# Patient Record
Sex: MALE
[De-identification: names, ages, dates, MRNs, and addresses within clinical notes are randomized per-mention and may not be internally consistent; named-entity substitution may affect disease eponyms.]

---

## 2018-05-05 ENCOUNTER — HOSPITAL ENCOUNTER (EMERGENCY)
Dept: HOSPITAL 31 - C.ER | Age: 48
Discharge: HOME | End: 2018-05-05
Payer: COMMERCIAL

## 2018-05-05 VITALS — SYSTOLIC BLOOD PRESSURE: 134 MMHG | DIASTOLIC BLOOD PRESSURE: 93 MMHG | HEART RATE: 76 BPM

## 2018-05-05 VITALS — RESPIRATION RATE: 18 BRPM | OXYGEN SATURATION: 99 % | TEMPERATURE: 97.8 F

## 2018-05-05 DIAGNOSIS — M54.5: Primary | ICD-10-CM

## 2018-05-05 NOTE — RAD
PROCEDURE:  Lumbar spine dated 05/05/2018 



HISTORY:

Back pain 



COMPARISON:

No prior.



FINDINGS:



BONES:

Normal alignment. No listhesis. No fracture.



DISC SPACES:

Disc space heights relatively maintained.  Small marginal osteophyte 

formation seen at the L4-L5 level.  Facets also slightly overgrown at 

L5-S1 through the L3-L4 levels in somewhat decreasing order of 

severity



OTHER FINDINGS:

None.



IMPRESSION:

No evidence of acute compression fractures no retropulsed fragments. 

Minor multilevel degenerative spondylosis

## 2018-05-05 NOTE — C.PDOC
History Of Present Illness


47-year-old male, presents to the emergency department with complaints of back 

pain x3 days. 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 Patient is 

currently complaining of lower back. Denies any bladder/bowel incontinence. 

Denies any abdominal pain, numbness/weakness, nausea/vomiting. patient did not 

take anything for the pain. 


Time Seen by Provider: 05/05/18 09:20


Chief Complaint (Nursing): Back Pain


History Per: Patient


History/Exam Limitations: no limitations





Past Medical History


Reviewed: Historical Data, Nursing Documentation, Vital Signs


Vital Signs: 


 Last Vital Signs











Temp  97.8 F   05/05/18 09:13


 


Pulse  76   05/05/18 11:09


 


Resp  18   05/05/18 11:09


 


BP  134/93 H  05/05/18 11:09


 


Pulse Ox  99   05/05/18 12:28











Family History: States: No Known Family Hx





- Social History


Hx Alcohol Use: No


Hx Substance Use: No





- Immunization History


Hx Tetanus Toxoid Vaccination: No


Hx Influenza Vaccination: No


Hx Pneumococcal Vaccination: No





Review Of Systems


Musculoskeletal: Positive for: Back Pain





Physical Exam





- Physical Exam


Appears: Non-toxic, No Acute Distress


Skin: Normal Color, Warm, Dry, No Rash


Head: Normacephalic


Eye(s): bilateral: PERRL


Nose: Normal


Oral Mucosa: Moist


Neck: Normal ROM, No Midline Cervical Tenderness, No Step Off Deformity


Back: No Vertebral Tenderness, Paraspinal Tenderness (lumbar)


Extremity: Normal ROM, No Deformity, No Swelling


Pulses: Left Dorsalis Pedis: Normal, Right Dorsalis Pedis: Normal


Neurological/Psych: Oriented x3, Normal Speech





ED Course And Treatment


O2 Sat by Pulse Oximetry: 99





Medical Decision Making


Medical Decision Making: 


Impression:   Back pain





Plan:


* Flexeril, Motrin, Percocet


* XR LS SPine


* Reassess and Disposition





Disposition


Counseled Patient/Family Regarding: Studies Performed, Diagnosis, Need For 

Followup, Rx Given





- Disposition


Referrals: 


[facility] at [facility] [Outside]


Disposition: HOME/ ROUTINE


Disposition Time: 10:48


Condition: STABLE


Additional Instructions: 


follow up with medical clinic in 2 days


call to make an appointment


take medications as prescribed


no heavy lifting


return to ER if symptoms worsens or progress


Prescriptions: 


Acetaminophen/Codeine [Tylenol/Codeine 300 MG/30 MG] 1 tab PO Q6H PRN #12 tab


 PRN Reason: Pain, Severe (8-10)


Cyclobenzaprine [Cyclobenzaprine HCl] 10 mg PO TID PRN #15 tab


 PRN Reason: Pain, Moderate (4-7)


Naproxen [Naprosyn] 500 mg PO BID PRN #16 tab


 PRN Reason: Pain, Moderate (4-7)


Instructions:  Low Back Pain  (DC)


Forms:  Gen Discharge Inst [place], [employer] (Tu[place]uan), Work Excuse


Print Language: Vietnamese





- Clinical Impression


Clinical Impression: 


 Low back pain








- Scribe Statement


The provider has reviewed the documentation as recorded by the Scribe (Fransisco Browne)








All medical record entries made by the Scribe were at my direction and 

personally dictated by me. I have reviewed the chart and agree that the record 

accurately reflects my personal performance of the history, physical exam, 

medical decision making, and the department course for this patient. I have 

also personally directed, reviewed, and agree with the discharge instructions 

and disposition.